# Patient Record
Sex: FEMALE | URBAN - NONMETROPOLITAN AREA
[De-identification: names, ages, dates, MRNs, and addresses within clinical notes are randomized per-mention and may not be internally consistent; named-entity substitution may affect disease eponyms.]

---

## 2018-08-24 ENCOUNTER — NURSE TRIAGE (OUTPATIENT)
Dept: ADMINISTRATIVE | Age: 28
End: 2018-08-24

## 2018-08-24 NOTE — TELEPHONE ENCOUNTER
She had a  on 20 and where is IV was it is all red, swollen          Reason for Disposition   Minor bruising at prior IV site    Answer Assessment - Initial Assessment Questions  1. SYMPTOM:  \"What's the main symptom you're concerned about? \" (e.g., pain, redness, swelling, pus)      Swelling and pain  2. ONSET: \"When did the ________  start? \"      Started last night  3. IV WHAT: \"What kind of IV line do you have? \" (e.g., central line, PICC, peripheral IV)      PIV  4. IV WHERE: \"Where does the IV enter your body? \"      Left forearm  5. IV WHEN: \"When was this IV put in?\"      CB  6. IV WHY: \"Why do you have this IV line? \"      Surgery   7. IV RUNNING OK: \"Is the IV running OK? \" (e.g., running OK, running slowly, not running, unable to flush)       IV out  8. PAIN: \"Is there any pain? \" If so, ask: \"How bad is the pain? \"   (e.g., scale 1-10; or mild, moderate, severe)      Can be up to an 8  9. OTHER SYMPTOMS: \"Do you have any other symptoms? \" (e.g., fever, shaking chills, new weakness, pus)      denies  10. VISITING NURSE: \"Do you have a visiting nurse? \" (e.g., home health nurse, IV infusion nurse)        none    Protocols used: IV SITE (SKIN) [facility]